# Patient Record
Sex: FEMALE | Race: WHITE | NOT HISPANIC OR LATINO
[De-identification: names, ages, dates, MRNs, and addresses within clinical notes are randomized per-mention and may not be internally consistent; named-entity substitution may affect disease eponyms.]

---

## 2018-11-28 ENCOUNTER — RESULT REVIEW (OUTPATIENT)
Age: 43
End: 2018-11-28

## 2021-06-15 ENCOUNTER — APPOINTMENT (OUTPATIENT)
Dept: OTOLARYNGOLOGY | Facility: CLINIC | Age: 46
End: 2021-06-15

## 2021-09-21 ENCOUNTER — APPOINTMENT (OUTPATIENT)
Dept: OTOLARYNGOLOGY | Facility: CLINIC | Age: 46
End: 2021-09-21

## 2021-10-20 ENCOUNTER — APPOINTMENT (OUTPATIENT)
Dept: OTOLARYNGOLOGY | Facility: CLINIC | Age: 46
End: 2021-10-20
Payer: COMMERCIAL

## 2021-10-20 PROCEDURE — 31231 NASAL ENDOSCOPY DX: CPT

## 2021-10-20 PROCEDURE — 99204 OFFICE O/P NEW MOD 45 MIN: CPT | Mod: 25

## 2021-10-21 NOTE — PHYSICAL EXAM
[de-identified] : +cracking/crepitus right TMJ with pain [] : septum deviated to the right [de-identified] : hypertrophy [Midline] : trachea located in midline position [Normal] : no rashes

## 2021-10-21 NOTE — ASSESSMENT
[FreeTextEntry1] : TMJ:\par - discussed etiology of TMJ and why can cause referred ear pain\par - advised to give a trial of NSAIDS, warm compresses, soft diet, no chewing gum\par - recommend referral to dentist or oral surgery for TMJ eval/treatment since she has crepitus/pain and needs a dental guard made\par \par sinus pressure:\par - normal endoscopy but will check CT sinus to double check that no sinus pathology that might need to be surgically treated\par \par DNS/ITH:\par - discussed septoplasty/ITR\par - she has flonase at home; will start and recommend using daily for one month\par - will discuss septo/turbs +/- FESS after she has her CT sinus\par \par

## 2021-10-21 NOTE — HISTORY OF PRESENT ILLNESS
[de-identified] : 47yo female with ear pain and sees outside ENT that was told was TMJ. She had the vacuum to suction the ear and feels it was hurting for 5 days after. She notes it is improved but now is mildly uncomfortable. She uses a warm redlight on the ear and advil but still hurts. She has not pursued a dental guard as she is concerned about her veneers/bridge being damaged. \par She notes pressure in her forehead, mostly when she lies down. it is all the time. She has trouble breathing through the right side of her nose for many years. She never had nasal surgery. She has tried nasal sprays without relief.

## 2021-10-21 NOTE — PROCEDURE
[FreeTextEntry6] : Afrin and lidocaine were topically sprayed. Flexible scope #2 was used. Right nasal passage with severe right nasal septal deviation nearly obstructing this side. Left nasal passage with hypertrophied inferior, normal middle and superior turbinates. Nasal passage was patent with clear middle meatus and sphenoethmoid recess. No mucopurulence or polyps appreciated. Nasopharynx clear.

## 2021-12-06 ENCOUNTER — APPOINTMENT (OUTPATIENT)
Dept: CT IMAGING | Facility: CLINIC | Age: 46
End: 2021-12-06
Payer: COMMERCIAL

## 2021-12-06 ENCOUNTER — OUTPATIENT (OUTPATIENT)
Dept: OUTPATIENT SERVICES | Facility: HOSPITAL | Age: 46
LOS: 1 days | End: 2021-12-06
Payer: COMMERCIAL

## 2021-12-06 DIAGNOSIS — J34.2 DEVIATED NASAL SEPTUM: ICD-10-CM

## 2021-12-06 DIAGNOSIS — J34.3 HYPERTROPHY OF NASAL TURBINATES: ICD-10-CM

## 2021-12-06 DIAGNOSIS — J34.89 OTHER SPECIFIED DISORDERS OF NOSE AND NASAL SINUSES: ICD-10-CM

## 2021-12-06 PROCEDURE — 70486 CT MAXILLOFACIAL W/O DYE: CPT

## 2021-12-06 PROCEDURE — 70486 CT MAXILLOFACIAL W/O DYE: CPT | Mod: 26

## 2022-03-14 ENCOUNTER — APPOINTMENT (OUTPATIENT)
Dept: ULTRASOUND IMAGING | Facility: CLINIC | Age: 47
End: 2022-03-14
Payer: COMMERCIAL

## 2022-03-14 ENCOUNTER — APPOINTMENT (OUTPATIENT)
Dept: MAMMOGRAPHY | Facility: CLINIC | Age: 47
End: 2022-03-14

## 2022-03-14 PROCEDURE — 76641 ULTRASOUND BREAST COMPLETE: CPT | Mod: 50

## 2022-05-16 ENCOUNTER — APPOINTMENT (OUTPATIENT)
Dept: OTOLARYNGOLOGY | Facility: CLINIC | Age: 47
End: 2022-05-16
Payer: COMMERCIAL

## 2022-05-16 VITALS
WEIGHT: 218 LBS | BODY MASS INDEX: 35.03 KG/M2 | HEIGHT: 66 IN | SYSTOLIC BLOOD PRESSURE: 142 MMHG | DIASTOLIC BLOOD PRESSURE: 91 MMHG | HEART RATE: 71 BPM | TEMPERATURE: 98.6 F

## 2022-05-16 DIAGNOSIS — H61.22 IMPACTED CERUMEN, LEFT EAR: ICD-10-CM

## 2022-05-16 PROCEDURE — 31231 NASAL ENDOSCOPY DX: CPT

## 2022-05-16 PROCEDURE — 69210 REMOVE IMPACTED EAR WAX UNI: CPT

## 2022-05-16 PROCEDURE — 99214 OFFICE O/P EST MOD 30 MIN: CPT | Mod: 25

## 2022-05-16 NOTE — ASSESSMENT
[FreeTextEntry1] : Patient follows up continues to have headaches seen by neurologist nothing seems to be helping CAT scan was reviewed with her sinuses are clear she has a terribly deviated septum with a contact pressure-point on the right could be the etiology of her symptoms I told her she needs a septoplasty.  She is obviously very anxious on examination she had cerumen in the left ear which was curetted out she also suffers from TMJ she will think about it and get back to us if she wants to proceed.

## 2022-05-16 NOTE — REVIEW OF SYSTEMS
[Ear Pain] : ear pain [Nasal Congestion] : nasal congestion [Sinus Pressure] : sinus pressure [Negative] : Heme/Lymph

## 2022-05-16 NOTE — END OF VISIT
[FreeTextEntry3] : I saw and examined this patient in person. I have discussed with Roselyn Hill, Physician Assistant, in detail the above note and agree with the above assessment and plan of care.\par

## 2022-05-16 NOTE — HISTORY OF PRESENT ILLNESS
[de-identified] : Patient has been having issues with head pressure on the top of the head and in the left side of the face intermittently for the last few weeks. She did have COVID about a month ago and this caused moderate to severe nasal congestion. She has been having issues breathing through the right side of the nose for years. She thinks she may have an ear infection as she has some occasional burning in the ears and moderate pressure that is dull and aching. She has not had any recent bloody noses or sinus infections treated with antibiotics recently. She has anxiety and states that she would not be able to tolerate not being able to breathe through her nose after nasal surgery for the septum\par She is not currently using any nasal sprays

## 2022-06-29 ENCOUNTER — APPOINTMENT (OUTPATIENT)
Dept: ORTHOPEDIC SURGERY | Facility: CLINIC | Age: 47
End: 2022-06-29

## 2022-09-28 ENCOUNTER — APPOINTMENT (OUTPATIENT)
Dept: OTOLARYNGOLOGY | Facility: CLINIC | Age: 47
End: 2022-09-28

## 2022-09-28 VITALS
HEIGHT: 66 IN | BODY MASS INDEX: 34.07 KG/M2 | WEIGHT: 212 LBS | DIASTOLIC BLOOD PRESSURE: 95 MMHG | SYSTOLIC BLOOD PRESSURE: 167 MMHG | HEART RATE: 78 BPM

## 2022-09-28 DIAGNOSIS — J34.3 HYPERTROPHY OF NASAL TURBINATES: ICD-10-CM

## 2022-09-28 DIAGNOSIS — J34.89 OTHER SPECIFIED DISORDERS OF NOSE AND NASAL SINUSES: ICD-10-CM

## 2022-09-28 PROCEDURE — 99214 OFFICE O/P EST MOD 30 MIN: CPT | Mod: 25

## 2022-09-28 PROCEDURE — 92557 COMPREHENSIVE HEARING TEST: CPT

## 2022-09-28 PROCEDURE — 92567 TYMPANOMETRY: CPT

## 2022-09-28 PROCEDURE — G0268 REMOVAL OF IMPACTED WAX MD: CPT

## 2022-09-28 RX ORDER — CLONAZEPAM 0.5 MG/1
0.5 TABLET, ORALLY DISINTEGRATING ORAL
Qty: 60 | Refills: 0 | Status: COMPLETED | COMMUNITY
Start: 2021-11-24 | End: 2022-09-28

## 2022-09-28 RX ORDER — VALACYCLOVIR 500 MG/1
500 TABLET, FILM COATED ORAL
Qty: 30 | Refills: 0 | Status: COMPLETED | COMMUNITY
Start: 2021-11-24 | End: 2022-09-28

## 2022-09-28 RX ORDER — FLUTICASONE PROPIONATE 50 UG/1
50 SPRAY, METERED NASAL DAILY
Qty: 1 | Refills: 3 | Status: COMPLETED | COMMUNITY
Start: 2022-05-16 | End: 2022-09-28

## 2022-09-28 RX ORDER — CLINDAMYCIN PHOSPHATE 20 MG/G
2 CREAM VAGINAL
Qty: 40 | Refills: 0 | Status: COMPLETED | COMMUNITY
Start: 2021-11-20 | End: 2022-09-28

## 2022-09-28 RX ORDER — RIMEGEPANT SULFATE 75 MG/75MG
75 TABLET, ORALLY DISINTEGRATING ORAL
Refills: 0 | Status: ACTIVE | COMMUNITY

## 2022-09-28 NOTE — REASON FOR VISIT
[Initial Evaluation] : an initial evaluation for [Nasal Septum (Deviated)] : deviated nasal septum [Nasal Obstruction] : nasal obstruction [FreeTextEntry2] : here for bilateral otalgia, right is worse than left, radiating to back of head

## 2022-09-28 NOTE — PHYSICAL EXAM
[] : septum deviated to the right [Normal] : no rashes [FreeTextEntry1] : Otalgia decreased hearing radiating pain unable to breathe through right-sided nose facial pain and pressure extending into frontal region [de-identified] : Bilateral cerumen impaction left greater than right cleared [de-identified] : Compensatory turbinate hypertrophy

## 2022-09-28 NOTE — PROCEDURE
[Risk and Benefits Discussed] : The purpose, risks, discomforts, benefits and alternatives of the procedure have been explained to the patient including no treatment. [Cerumen Impaction] : Cerumen Impaction [] : Removal of Cerumen [FreeTextEntry1] : Complaining of otalgia decreased hearing found to have cerumen impaction [FreeTextEntry6] : Patient placed in exam chair sitting position cerumen removed with a combination of magnification ear speculum wax loop and irrigation tympanic membranes clear

## 2022-09-28 NOTE — CONSULT LETTER
[Dear  ___] : Dear  [unfilled], [Courtesy Letter:] : I had the pleasure of seeing your patient, [unfilled], in my office today. [Please see my note below.] : Please see my note below. [Sincerely,] : Sincerely, [FreeTextEntry2] : Nyasia Dinero [FreeTextEntry3] : Ryan Yen MD, CARISSA, FACS\par  Department Otolaryngology\par Director of [facility]\par Professor of Otolaryngology, \par [employer]\par

## 2022-09-28 NOTE — HISTORY OF PRESENT ILLNESS
[de-identified] : 47 year old female here for bilateral otalgia, right is worse than left, radiating to back of head.  States has symptoms for the past 2 days.  States bottom right teeth are hurting from the pain in the right ear.  States for the past few years, when around people with a certain tone in the voice, has a burning sensation in the left ear.  States feels hearing may be diminished.  Patient denies otorrhea, ear infections, tinnitus, dizziness, vertigo.  Patient also unable to breathe through right side of her nose associated with facial pain and pressure.  Patient has been on Flonase steroid nasal spray extensively without relief

## 2022-09-28 NOTE — DATA REVIEWED
[de-identified] : CT scan of the sinuses shows sinuses were clear to his deviated septum turbinate hypertrophy

## 2022-12-01 ENCOUNTER — NON-APPOINTMENT (OUTPATIENT)
Age: 47
End: 2022-12-01

## 2022-12-01 ENCOUNTER — APPOINTMENT (OUTPATIENT)
Dept: OTOLARYNGOLOGY | Facility: CLINIC | Age: 47
End: 2022-12-01

## 2022-12-01 VITALS
WEIGHT: 212 LBS | BODY MASS INDEX: 34.07 KG/M2 | TEMPERATURE: 98.3 F | HEART RATE: 70 BPM | SYSTOLIC BLOOD PRESSURE: 155 MMHG | DIASTOLIC BLOOD PRESSURE: 86 MMHG | HEIGHT: 66 IN

## 2022-12-01 DIAGNOSIS — M26.609 UNSPECIFIED TEMPOROMANDIBULAR JOINT DISORDER: ICD-10-CM

## 2022-12-01 DIAGNOSIS — H91.93 UNSPECIFIED HEARING LOSS, BILATERAL: ICD-10-CM

## 2022-12-01 DIAGNOSIS — H61.23 IMPACTED CERUMEN, BILATERAL: ICD-10-CM

## 2022-12-01 DIAGNOSIS — J34.2 DEVIATED NASAL SEPTUM: ICD-10-CM

## 2022-12-01 DIAGNOSIS — H92.02 OTALGIA, LEFT EAR: ICD-10-CM

## 2022-12-01 PROCEDURE — 99214 OFFICE O/P EST MOD 30 MIN: CPT

## 2022-12-01 RX ORDER — PROPRANOLOL HYDROCHLORIDE 20 MG/1
20 TABLET ORAL
Qty: 30 | Refills: 0 | Status: ACTIVE | COMMUNITY
Start: 2022-11-15

## 2022-12-01 RX ORDER — DIAZEPAM 5 MG/1
5 TABLET ORAL
Qty: 2 | Refills: 0 | Status: ACTIVE | COMMUNITY
Start: 2022-12-01 | End: 1900-01-01

## 2022-12-01 RX ORDER — MUPIROCIN 20 MG/G
2 OINTMENT TOPICAL
Qty: 22 | Refills: 0 | Status: ACTIVE | COMMUNITY
Start: 2022-06-15

## 2022-12-01 NOTE — PHYSICAL EXAM
[de-identified] : crepitus w/o tenderness,,left pterygoid tight [de-identified] : scant wax removed bilat [] : septum deviated bilaterally [Midline] : trachea located in midline position [Normal] : no rashes

## 2022-12-01 NOTE — HISTORY OF PRESENT ILLNESS
[de-identified] : 48 yo female\par chronic left sided ear pain radiating to scalp and neck\par h/o TMJ\par does not feel it is sinus related [Hearing Loss] : no hearing loss [Nasal Congestion] : no nasal congestion [None] : The patient is currently asymptomatic.

## 2022-12-01 NOTE — ASSESSMENT
[FreeTextEntry1] : Ear Pain 2/2 TMJ dysfxn\par Rec stress redxn\par    DDS eval\par    acupuncture\par MRI of TMJ ordered

## 2022-12-09 ENCOUNTER — NON-APPOINTMENT (OUTPATIENT)
Age: 47
End: 2022-12-09

## 2022-12-14 ENCOUNTER — APPOINTMENT (OUTPATIENT)
Dept: MRI IMAGING | Facility: IMAGING CENTER | Age: 47
End: 2022-12-14

## 2023-06-29 ENCOUNTER — APPOINTMENT (OUTPATIENT)
Dept: ULTRASOUND IMAGING | Facility: CLINIC | Age: 48
End: 2023-06-29
Payer: COMMERCIAL

## 2023-06-29 ENCOUNTER — RESULT REVIEW (OUTPATIENT)
Age: 48
End: 2023-06-29

## 2023-06-29 PROCEDURE — 76700 US EXAM ABDOM COMPLETE: CPT

## 2024-12-31 ENCOUNTER — TRANSCRIPTION ENCOUNTER (OUTPATIENT)
Age: 49
End: 2024-12-31

## 2024-12-31 ENCOUNTER — APPOINTMENT (OUTPATIENT)
Dept: UROGYNECOLOGY | Facility: CLINIC | Age: 49
End: 2024-12-31
Payer: COMMERCIAL

## 2024-12-31 VITALS
HEART RATE: 62 BPM | WEIGHT: 205 LBS | BODY MASS INDEX: 32.95 KG/M2 | DIASTOLIC BLOOD PRESSURE: 85 MMHG | HEIGHT: 66 IN | SYSTOLIC BLOOD PRESSURE: 142 MMHG

## 2024-12-31 DIAGNOSIS — N81.2 INCOMPLETE UTEROVAGINAL PROLAPSE: ICD-10-CM

## 2024-12-31 DIAGNOSIS — N81.11 CYSTOCELE, MIDLINE: ICD-10-CM

## 2024-12-31 DIAGNOSIS — N39.41 URGE INCONTINENCE: ICD-10-CM

## 2024-12-31 DIAGNOSIS — R35.0 FREQUENCY OF MICTURITION: ICD-10-CM

## 2024-12-31 DIAGNOSIS — R39.15 URGENCY OF URINATION: ICD-10-CM

## 2024-12-31 LAB
BILIRUB UR QL STRIP: NORMAL
CLARITY UR: NORMAL
COLLECTION METHOD: NORMAL
GLUCOSE UR-MCNC: NORMAL
HCG UR QL: 0.2 EU/DL
HGB UR QL STRIP.AUTO: NORMAL
KETONES UR-MCNC: NORMAL
LEUKOCYTE ESTERASE UR QL STRIP: NORMAL
NITRITE UR QL STRIP: NORMAL
PH UR STRIP: 6
PROT UR STRIP-MCNC: NORMAL
SP GR UR STRIP: 1.02

## 2024-12-31 PROCEDURE — 99204 OFFICE O/P NEW MOD 45 MIN: CPT | Mod: 25

## 2024-12-31 PROCEDURE — 99459 PELVIC EXAMINATION: CPT | Mod: NC

## 2024-12-31 PROCEDURE — 51701 INSERT BLADDER CATHETER: CPT

## 2024-12-31 RX ORDER — MIRABEGRON 50 MG/1
50 TABLET, FILM COATED, EXTENDED RELEASE ORAL DAILY
Qty: 90 | Refills: 0 | Status: ACTIVE | COMMUNITY
Start: 2024-12-31 | End: 1900-01-01

## 2024-12-31 RX ORDER — MIRABEGRON 50 MG/1
50 TABLET, EXTENDED RELEASE ORAL
Qty: 30 | Refills: 5 | Status: COMPLETED | COMMUNITY
Start: 2024-12-31 | End: 2024-12-31

## 2024-12-31 NOTE — DISCUSSION/SUMMARY
[FreeTextEntry1] :  We reviewed management options for her prolapse including: observation, pelvic floor exercises with and without PT, pessary, and surgical management. We reviewed various surgical management options including vaginal, laparoscopic/robotic, abdominal procedures. We reviewed procedures involving hysterectomy as well as uterine sparing procedures. We also reviewed both mesh and non-mesh options. Written IUGA information on prolapse treatment options was also provided to her to review. She would like to try a pessary and will RTO for pessary fitting. IUGA information on pessaries provided.   We discussed possible etiologies of her urinary symptoms including overactive bladder. I recommend she start behavioral modifications and bladder training. Written instructions on how to perform modifications were provided. We reviewed medications for overactive bladder.  She will try an OAB medication. Mirabegron 50 mg daily Rx provided with refills. Risks and side effects reviewed extensively. IUGA info on OAB provided as well.   The following treatment plan was designed for this patient and provided to her in written form and reviewed extensively. Patient was given a copy to take home:   For Urinary Symptoms (frequency, urgency, difficulty holding urine): **Total fluids: 34-50 oz (1-1.5 Liters) per day   -Ex. 8 oz coffee or tea (NOT BOTH!), 2-3 bottles of water (Each bottle is 16.9 oz). No flavoring added. No seltzer or Pelligrino!  -Drink slowly throughout day, no binge drinking (each bottle of water should take you hours, not minutes) **Avoid: 2nd cup coffee or tea (even if decaf), alcohol, carbonation (soda, seltzer), spicy foods, citrus, artificial sweeteners, chocolate **Stop eating and drinking 2-3 hours before bedtime (sips ok)  -Try the above fluid changes and start Mirabegron 50 mg daily in the morning. You must continue the fluid changes while on medication. Medication may take 3-4 weeks to start working. Common side effects include: dry mouth, dry eyes, constipation. If side effects try to manage first: Dry eyes: lubricating eye drops Dry mouth: biotene mouth wash Constipation: Miralax  If not covered by insurance, call insurance company and get list of overactive bladder medications that are covered. Call my office with list (Mon-Fri) and we will change medication

## 2024-12-31 NOTE — PHYSICAL EXAM
[Chaperone Present] : A chaperone was present in the examining room during all aspects of the physical examination [[id number]] : A chaperone was present during the pelvic exam. [Labia Majora] : were normal [Normal Appearance] : general appearance was normal [Aa ____] : Aa [unfilled] [Ba ____] : Ba [unfilled] [C ____] : C [unfilled] [GH ____] : GH [unfilled] [PB ____] : PB [unfilled] [TVL ____] : TVL  [unfilled] [Ap ____] : Ap [unfilled] [Bp ____] : Bp [unfilled] [D ____] : D [unfilled] [Normal] : no abnormalities [FreeTextEntry1] : General: Well, appearing. Alert and orientated. No acute distress HEENT: Normocephalic, atraumatic and extraocular muscles appear to be intact  Neck: Full range of motion, no obvious lymphadenopathy, deformities, or masses noted  Respiratory: Speaking in full sentences comfortably, normal work of breathing and no cough during visit Musculoskeletal: active full range of motion in extremities  Extremities: No upper extremity edema noted Skin: no obvious rash or skin lesions Neuro: Orientated X 3, speech is fluent, normal rate Psych: Normal mood and affect    [Tenderness] : ~T no ~M abdominal tenderness observed [Distended] : not distended

## 2024-12-31 NOTE — REASON FOR VISIT
[Questionnaire Received] : Patient questionnaire received [Intake Form Reviewed] : Patient intake form with past medical history, surgical history, family history and social history reviewed today [Pelvic Organ Prolapse] : pelvic organ prolapse [Urine Frequency] : urine frequency [Urinary Urgency] : urinary urgency

## 2024-12-31 NOTE — HISTORY OF PRESENT ILLNESS
[Rectal Prolapse] : moderate [Unable To Restrain Bowel Movement] : moderate [Feelings Of Urinary Urgency] : severe [x3+] : nocturia three or more  times a night [Urinary Tract Infection] : severe [Stool Visible Blood] : no [Incomplete Emptying Of Stool] : no [Vaginal Pain] : mild [] : no [de-identified] : h/o chronic pelvic pain x 20 years [FreeTextEntry1] :  h/o IC diagnosis which she reports has since resolved  PMH: HTN, depression PSH: diagnostic laparoscopy and appendectomy  daily fluid intake: 1-1.5 L  water, 1-2 c green tea, 1-2 c coffee

## 2025-01-02 DIAGNOSIS — M53.9 DORSOPATHY, UNSPECIFIED: ICD-10-CM

## 2025-01-02 DIAGNOSIS — Z82.5 FAMILY HISTORY OF ASTHMA AND OTHER CHRONIC LOWER RESPIRATORY DISEASES: ICD-10-CM

## 2025-01-02 DIAGNOSIS — E66.9 OBESITY, UNSPECIFIED: ICD-10-CM

## 2025-01-02 DIAGNOSIS — F32.A DEPRESSION, UNSPECIFIED: ICD-10-CM

## 2025-01-02 DIAGNOSIS — Z78.9 OTHER SPECIFIED HEALTH STATUS: ICD-10-CM

## 2025-01-02 DIAGNOSIS — Z82.49 FAMILY HISTORY OF ISCHEMIC HEART DISEASE AND OTHER DISEASES OF THE CIRCULATORY SYSTEM: ICD-10-CM

## 2025-01-02 DIAGNOSIS — Z83.3 FAMILY HISTORY OF DIABETES MELLITUS: ICD-10-CM

## 2025-01-02 DIAGNOSIS — Z83.79 FAMILY HISTORY OF OTHER DISEASES OF THE DIGESTIVE SYSTEM: ICD-10-CM

## 2025-01-02 DIAGNOSIS — I10 ESSENTIAL (PRIMARY) HYPERTENSION: ICD-10-CM

## 2025-01-02 DIAGNOSIS — R58 HEMORRHAGE, NOT ELSEWHERE CLASSIFIED: ICD-10-CM

## 2025-01-03 ENCOUNTER — NON-APPOINTMENT (OUTPATIENT)
Age: 50
End: 2025-01-03

## 2025-01-03 RX ORDER — MIRABEGRON 50 MG/1
50 TABLET, EXTENDED RELEASE ORAL
Qty: 90 | Refills: 1 | Status: ACTIVE | COMMUNITY
Start: 2025-01-03 | End: 1900-01-01

## 2025-01-17 ENCOUNTER — APPOINTMENT (OUTPATIENT)
Dept: UROGYNECOLOGY | Facility: CLINIC | Age: 50
End: 2025-01-17

## 2025-01-17 NOTE — HISTORY OF PRESENT ILLNESS
[FreeTextEntry1] : Iza is a 49-year-old with known OAB on Myrbetriq 50mg PO daily (started 1/3/25) and POP who presents today for IPE.